# Patient Record
Sex: MALE | Race: WHITE | NOT HISPANIC OR LATINO
[De-identification: names, ages, dates, MRNs, and addresses within clinical notes are randomized per-mention and may not be internally consistent; named-entity substitution may affect disease eponyms.]

---
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
## 2022-08-12 ENCOUNTER — EMERGENCY (EMERGENCY)
Facility: HOSPITAL | Age: 22
LOS: 1 days | Discharge: HOME | End: 2022-08-12
Attending: EMERGENCY MEDICINE | Admitting: EMERGENCY MEDICINE
Payer: COMMERCIAL

## 2022-08-12 VITALS
TEMPERATURE: 99 F | OXYGEN SATURATION: 97 % | RESPIRATION RATE: 16 BRPM | DIASTOLIC BLOOD PRESSURE: 71 MMHG | SYSTOLIC BLOOD PRESSURE: 112 MMHG | HEART RATE: 90 BPM

## 2022-08-12 VITALS
OXYGEN SATURATION: 98 % | TEMPERATURE: 101 F | HEIGHT: 67.5 IN | WEIGHT: 190.04 LBS | DIASTOLIC BLOOD PRESSURE: 59 MMHG | HEART RATE: 128 BPM | RESPIRATION RATE: 20 BRPM | SYSTOLIC BLOOD PRESSURE: 107 MMHG

## 2022-08-12 LAB
FLUAV AG NPH QL: SIGNIFICANT CHANGE UP
FLUBV AG NPH QL: SIGNIFICANT CHANGE UP
RSV RNA NPH QL NAA+NON-PROBE: SIGNIFICANT CHANGE UP
SARS-COV-2 RNA SPEC QL NAA+PROBE: DETECTED

## 2022-08-12 PROCEDURE — 99285 EMERGENCY DEPT VISIT HI MDM: CPT | Mod: 25

## 2022-08-12 PROCEDURE — 71045 X-RAY EXAM CHEST 1 VIEW: CPT

## 2022-08-12 PROCEDURE — 93010 ELECTROCARDIOGRAM REPORT: CPT

## 2022-08-12 PROCEDURE — 99284 EMERGENCY DEPT VISIT MOD MDM: CPT

## 2022-08-12 PROCEDURE — 71045 X-RAY EXAM CHEST 1 VIEW: CPT | Mod: 26

## 2022-08-12 PROCEDURE — 93005 ELECTROCARDIOGRAM TRACING: CPT

## 2022-08-12 PROCEDURE — 87637 SARSCOV2&INF A&B&RSV AMP PRB: CPT

## 2022-08-12 RX ORDER — ONDANSETRON 8 MG/1
4 TABLET, FILM COATED ORAL ONCE
Refills: 0 | Status: COMPLETED | OUTPATIENT
Start: 2022-08-12 | End: 2022-08-12

## 2022-08-12 RX ORDER — IBUPROFEN 200 MG
800 TABLET ORAL ONCE
Refills: 0 | Status: COMPLETED | OUTPATIENT
Start: 2022-08-12 | End: 2022-08-12

## 2022-08-12 RX ADMIN — ONDANSETRON 4 MILLIGRAM(S): 8 TABLET, FILM COATED ORAL at 16:49

## 2022-08-12 RX ADMIN — Medication 800 MILLIGRAM(S): at 16:49

## 2022-08-12 NOTE — ED ADULT TRIAGE NOTE - CHIEF COMPLAINT QUOTE
Patient is a 21yo male complaining of fever and cough and flu like symptoms . Patient tested covid positive yesterday Patient states the symptoms are getting worse today. Patient took Tylenol yesterday

## 2022-08-12 NOTE — ED PROVIDER NOTE - OBJECTIVE STATEMENT
23 yo male with no significant pmh presents to the ED c/o fever, chills, body aches, mild cough since yesterday.  Associated with nausea. Patient had rapid covid swab at [facility] office which was positive. Patient did not take any medication for fever today. Denies chest pain, vomiting, diarrhea, urinary sxs, flank pain.

## 2022-08-12 NOTE — ED PROVIDER NOTE - PROGRESS NOTE DETAILS
Reevaluated patient at bedside.  Patient feeling much improved.  Discussed the results of all diagnostic testing in ED and copies of all available reports given.   An opportunity to ask questions was provided.  Discussed the importance of prompt, close medical follow-up.  Patient will return with any changes, concerns or persistent/worsening symptoms.  Understanding of all instructions verbalized.

## 2022-08-12 NOTE — ED PROVIDER NOTE - PATIENT PORTAL LINK FT
You can access the FollowMyHealth Patient Portal offered by [facility] by registering at the following website: [email or web address]. By joining [facility]’s FollowMyHealth portal, you will also be able to view your health information using other applications (apps) compatible with our system.

## 2022-08-12 NOTE — ED PROVIDER NOTE - CLINICAL SUMMARY MEDICAL DECISION MAKING FREE TEXT BOX
21 yo male with no significant pmh presents to the ED c/o fever, chills, body aches, mild cough since yesterday.  Associated with nausea. Patient had rapid covid swab at [facility] office which was positive. Patient did not take any medication for fever today. Denies chest pain, vomiting, diarrhea, urinary sxs, flank pain.  pt on exam febrile, tachy, antipyretic, zofran,  drink fluids.  lungs cta, cxt neg, pt vs normal after with antipyretic and oral fluids, covid +, fu pmd, antipyretics, fluids, cxr neg, fu pmd, return for sob, uncontrolled vomiting

## 2022-08-13 ENCOUNTER — TRANSCRIPTION ENCOUNTER (OUTPATIENT)
Age: 22
End: 2022-08-13

## 2022-08-15 NOTE — ED POST DISCHARGE NOTE - REASON FOR FOLLOW-UP
Informed pt that he tested positive for Covid and continued to fu c his pediatrician to further evalution.  If condtions worsen, to return to ED. Other

## 2022-12-08 NOTE — ED ADULT TRIAGE NOTE - ESI TRIAGE ACUITY LEVEL, MLM
Received forms from [employer] for patients recent procedure, needing to be completed and faxed back for patients disability claim. Placed in [facility] mailbox.   
2

## 2023-09-12 NOTE — ED PEDIATRIC NURSE NOTE - NSIMPLEMENTINTERV_GEN_ALL_ED
How Severe Are Your Spot(S)?: moderate What Is The Reason For Today's Visit?: Full Body Skin Examination What Is The Reason For Today's Visit? (Being Monitored For X): concerning skin lesions on an annual basis Implemented All Fall Risk Interventions:  [place] to call system. Call bell, personal items and telephone within reach. Instruct patient to call for assistance. Room bathroom lighting operational. Non-slip footwear when patient is off stretcher. Physically safe environment: no spills, clutter or unnecessary equipment. Stretcher in lowest position, wheels locked, appropriate side rails in place. Provide visual cue, wrist band, yellow gown, etc. Monitor gait and stability. Monitor for mental status changes and reorient to person, place, and time. Review medications for side effects contributing to fall risk. Reinforce activity limits and safety measures with patient and family.

## 2023-11-07 NOTE — ED PROVIDER NOTE - PSH
Radiation Oncology Consultation    Shaneka Givens  : 1940  MRN: [id number]    Date of Consult: 2017    Primary Physician: Johanny Taveras MD  Referring Physician: Matthew Hightower MD    Cancer Diagnosis: Esophageal cancer     Cancer Stage: cT3 N1 MX, IIIA    Chief Complaint: 78 y/o female with history of anemia, workup revealed an esophageal mass biopsy confirmed squamous cell carcinoma, EUS staged as T3, w/ biopsy of gastric hepatic LN positive for SqCC, N1. Seen today in [facility] to discuss treatment recommendations.     Radiation History:   None    History of Present Illness:  The patient is a 77-year-old female with a medical history significant for hyperlipidemia, diabetes mellitus type 2, hypertension, peripheral vascular disease, and coronary artery disease. She has a history of anemia dating to at least . She underwent an EGD on 13 by Dr. Boyle with finding of bleeding secondary to AVM, Hemoclip placed. On recent routine follow-up with her primary care physician she was noted to have anemia with a hemoglobin of 9.2016 and 8.2017. She also reported a several month history of melana and was referred to Dr. Boyle for evaluation to ruleout GI bleed. EGD 17 showed questionable Skaggs's in the distal esophagus and a mid esophageal lesion at approximately 30 cm, measuring 2 cm in length, gastritis and gastric AVM without evidence of bleeding. Biopsy from the mid esophageal lesion returned as invasive squamous cell carcinoma, moderately differentiated. A CT of the C/A/P from 17 shows no evidence of metastatic disease within the chest, CT of the abdomen is suggestive of gastric cardia thickening and adjacent gastrohepatic adenopathy. Follow-up CT of the chest from 6/15/16 redemonstrates multiple stable pulmonary nodules dating back to at least 14 which are considered benign. 2 new small parenchymal opacities at the posterior aspect of the left midlung with differential 
including atelectasis versus infiltrate. An EUS from 5/31/17 demonstrates a 3 cm raised lesion in the midesophagus extending from 29-32 cm occupying approximately 30% of the circumference of the esophageal lumen. Hypoechoic lesion measuring approximately 12.5 mm x 7.4 mm located about 29-30 cm extending from the superficial mucosa through the muscular propria and into the adventitia. There are 2 malignant-appearing lymph nodes in the gastrohepatic ligament area measuring approximately 11.7 x 8.5 and 7.4 x 6.1 mm. Biopsies returned as moderately differentiated keratinizing squamous cell carcinoma. This stages her clinically as a T3 N1 MX. Remainder of the US examination was normal. PET/CT from 5/31/17 demonstrates a focal area of increased FDG uptake in the midesophagus compatible with history of esophageal carcinoma. Additional area of mild FDG uptake is noted at the distal esophagus and may reflect inflammation or Skaggs's esophagus as identified on the EGD. FDG avid gastrohepatic ligament lymph node is suspicious for metastasis. Mild FDG avidity is noted within a few left upper lobe semisolid and groundglass pulmonary nodules, one of which has slightly increased in size and appears more solid from the comparison exam going back to 8/20/14. Low-grade malignancy cannot be excluded. No FDG avid thoracic lymphadenopathy is noted.    The patient presents today to the multidisciplinary clinic accompanied by several family members. She reports an unintentional weight loss of approximately 17 pounds over the past year and a half, however denies dysphagia, she states she is able to eat without restriction. She does tend to eat only 2 meals a day and states that she does not snack in between meals. She denies odynophagia or headaches. She was having melana and started iron supplements for anemia, her stools remain dark in color however her hemoglobin improved to 11.2 on 5/31/17. She has a long-standing history of nicotine 
dependence, per record review she has smoked up to 3 packs of cigarettes a day, decreasing to 1/4 ppd within the past few years. She underwent repair of transperineal rectocele and enterocele 2016 and states that her bowel movements have not been normal since then. She reports a several year history of numbness and tingling involving her fingers and toes and states that she tends to be children most of the time. She is very independent and able to perform all of her ADLs. She states that her   from rectal cancer in . She currently lives with her son.          Past Medical History:  Past Medical History:   Diagnosis Date   • Anemia 2017   • Benign hypertension 2012   • Complete rupture of rotator cuff     surg in    • Endometriosis of other specified sites    • Esophageal mass 2017   • Gastric AVM 2017   • Gastritis 2017   • HYPERLIPIDEMIA NEC/NOS, low hdl 2001   • Intramural leiomyoma of uterus    • Malignant neoplasm (CMS/[facility]) 2017    Invasive squamous cell carcinoma, moderately differentiated.    • PVD (peripheral vascular disease) (CMS/[facility]) 10/8/2012   • Tobacco use disorder    • Type II or unspecified type diabetes mellitus without mention of complication, not stated as uncontrolled     Diabetes Mellitus   • Unspecified hypothyroidism     + antibodies   • Urinary tract infection        Past Surgical History:  Past Surgical History:   Procedure Laterality Date   • APPENDECTOMY  high school   • BACK SURGERY  2005    cervical fusion and disectomy   • BIOPSY OF BREAST, NEEDLE CORE  10/6/03    calcifications in site 1 and 2   • COLONOSCOPY W BIOPSY  3/17/04    Dr. Rodriguez w/bx   • DEXA BONE DENSITY AXIAL SKELETON  03   • DEXA BONE DENSITY AXIAL SKELETON  03    T scor 0.1 to -1.o   • DEXA BONE DENSITY AXIAL SKELETON  06    Tscore: left hip: -1.2, osteopenia. Lumbar spine tscore: -0.7. Forearm tscore 0.9 Normal   • ESOPHAGOGASTRODUODENOSCOPY 
TRANSORAL FLEX W/BX SINGLE OR MULT  05/18/2017    Esophaeal mass   • FOOT SURGERY     • HYSTERECTOMY  1971   • LOWER EXTREMITY ANGIOGRAMS/POSSIBLE PTA/POSSIBLE STENT  11/20/12    Dr. Moya at [facility]. Severe PVD. Total occlusion of R Superficial femoral artery. 90 % stenosis of distal left superficial femoral artery with stenting. 60 % lesion in mid SFA. Pt needs peripheral bypass surgery on the right. Both posterior tibial and both peroneal arteries are totally occluded below the knee. Left anterior tibial artery is totally occluded.    • MAMMO DIAGNOSTIC BILATERAL  12-6-2013   • MRI SHOULDER ARTHRO LFT  12/3/04    Very mild tendinopathy. Minimal degenerative subcortical cysts. No full thickness tear.    • MRI SHOULDER ARTHRO RT  1/6/03    small collection of fluid just caudal to the tip of coracoid process of scapula, mild DJD of acromioclavicular joint, smal subchondral cyst of postlateral humeral head   • REPAIR ROTATOR CUFF,ACUTE  2/10/03    right shoulder   • REPAIR ROTATOR CUFF,CHRONIC  2/05    left shoulder   • REPAIR ROTATOR CUFF,CHRONIC  2003    right shoulder   • SHLDR ARTHROSCOP,DIAGNOSTIC  2/10/03   • TONSILLECTOMY AND ADENOIDECTOMY     • XRAY MAMMOGRAM SCREENING BILAT  9/5/02    bilta benign calcifications   • XRAY MAMMOGRAM SCREENING BILAT  9/26/03    [facility] 2 groups of indeterminate calcifications in the retoareolar region of R breast, superiorly, raad pt return for spot magnification views   • XRAY MAMMOGRAM SCREENING BILAT  10/13/04    postbiopsy changes in R breast, benign-appearing calcifications are stable       Medications:  Current Outpatient Prescriptions   Medication Sig Dispense Refill   • pioglitazone-metformin (ACTOPLUS MET)  MG per tablet Take 1 tablet by mouth  daily 90 tablet 2   • simvastatin (ZOCOR) 20 MG tablet Take 1 tablet by mouth  daily 90 tablet 2   • losartan (COZAAR) 50 MG tablet Take 1 tablet by mouth  daily 90 tablet 2   • clopidogrel (PLAVIX) 75 MG 
tablet Take 1 tablet by mouth  daily 90 tablet 2   • oxybutynin (DITROPAN) 5 MG tablet Take 1 tablet by mouth  twice a day 180 tablet 2   • levothyroxine (SYNTHROID, LEVOTHROID) 75 MCG tablet Take 1 tablet by mouth daily. 90 tablet 2   • cilostazol (PLETAL) 100 MG tablet Take 1 tablet by mouth 2 times daily. 180 tablet 3   • aspirin 81 MG EC tablet Take 1 tablet by mouth daily. 0 30 tablet 0   • cholecalciferol (VITAMIN D3) 1000 UNITS tablet Take 1 tablet by mouth daily. 1 tablet 0   • Lancets (ACCU-CHEK MULTICLIX) Misc TO TAKE FINGERSTICKS ONCE DAILY. 102 each 2   • ACCU-CHEK HONG PLUS TO CHECK FINGERSTICKS ONCE DAILY. DX: TYPE 2 DIABETES. ICD-10: E11.9 ICD-9 250.00 100 strip 2   • furosemide (LASIX) 20 MG tablet Take 1 tablet by mouth daily. 5 tablet 0   • Calcium Carb-Cholecalciferol 500-200 MG-UNIT Tab Take 1 tablet by mouth daily.     • Multiple Vitamins-Minerals (CENTRUM SILVER) tablet Take 1 tablet by mouth daily.     • [DISCONTINUED] oxybutynin (DITROPAN) 5 MG tablet Take 1 tablet by mouth 2 times daily. 180 tablet 3     No current facility-administered medications for this visit.        Allergies:  ALLERGIES:   Allergen Reactions   • Cephalosporins HIVES and RASH     hives on abdomen       Family History:  family history includes Cancer in her father; Diabetes in her brother, maternal grandmother, and sister; GI in her sister; OTHER in her mother.    Social History:  Social History     Social History   • Marital status:      Spouse name: N/A   • Number of children: 2   • Years of education: N/A     Occupational History   • retired [employer]     Social History Main Topics   • Smoking status: Current Every Day Smoker     Packs/day: 3.00     Years: 55.00     Types: Cigarettes   • Smokeless tobacco: Never Used      Comment: 2/2/14 pt cut back to 5 cigarettes a day, previously smoked 3 PPD.    • Alcohol use 0.0 - 0.6 oz/week     0 - 1 Standard drinks or equivalent per week      Comment: couple drinks a month 
  • Drug use: No   • Sexual activity: Not on file     Other Topics Concern   • Exercise Yes   • Seat Belt Yes     Social History Narrative       Review of Systems:  As noted in the History of Present Illness. A 10 point review of systems was completed and all were  negative, except as noted in the HPI.    Pain: no  Level of pain: 0 /10    Physical Exam:  General: Appears stated age, well-developed, well-nourished, no acute distress  Visit Vitals   • /63   • Pulse 78   • Temp 98.2 °F (36.8 °C) (Oral)   • Ht 5' 4\" (1.626 m)   • Wt 46 kg   • SpO2 96%   • BMI 17.41 kg/m2   Neurologic:  Alert and oriented x 3. Cranial nerves 2-12 normal. Normal motor function. Normal sensory function. No focal deficits noted.  Lungs: Equal, symmetric expansion, auditory distress  Integument:  Well hydrated, no rash.  Extremity: Ambulatory,. No cyanosis. No clubbing.  ECOG Performance Status: 0: Fully active, able to carry on all pre-disease performance without restriction    Laboratory Data:  Sodium (mmol/L)   Date Value   04/21/2017 142     Potassium (mmol/L)   Date Value   05/31/2017 4.2     Chloride (mmol/L)   Date Value   04/21/2017 109 (H)     CO2 (mmol/L)   Date Value   09/12/2012 29     Carbon Dioxide (mmol/L)   Date Value   04/21/2017 26     BUN (mg/dL)   Date Value   04/21/2017 15     Creatinine (mg/dL)   Date Value   05/31/2017 0.59     Glucose (mg/dL)   Date Value   04/21/2017 143 (H)     CALCIUM (mg/dL)   Date Value   04/21/2017 9.2   09/12/2012 9.9     No components found for: MAGNESIUM  No components found for: PHOSPHORUS  TOTAL BILIRUBIN (mg/dL)   Date Value   04/21/2017 0.2     AST/SGOT (Units/L)   Date Value   04/21/2017 14     ALT/SGPT (Units/L)   Date Value   04/21/2017 14     No components found for: TOTAL PROTEIN  Albumin (g/dL)   Date Value   04/21/2017 3.8     ALK PHOSPHATASE (Units/L)   Date Value   04/21/2017 76     WBC (K/mcL)   Date Value   05/31/2017 5.5     HGB (g/dL)   Date Value   05/31/2017 11.2 (L) 
    HCT (%)   Date Value   05/31/2017 35.8 (L)     PLT   Date Value   05/31/2017 251 K/mcL   10/14/2013 420 K/mcL   08/11/2005 316 K/uL     Absolute Neutrophil (K/mcL)   Date Value   04/21/2017 4.3       Review of Imaging:  As noted in the History of Present Illness.  The recent imaging was personally reviewed.    Impression:  The patient is a 78 y/o female with a prior history of anemia and AVM in 2013. More recently experiencing anemia with melena, GI workup revealed a mid esophageal mass,  biopsy confirmed squamous cell carcinoma. Staged as T3 on EUS with biopsy of gastric hepatic LN positive for squamous cell carcinoma, N1. PET/CT demonstrates focal increased uptake in the midesophagus and gastrohepatic ligament lymph node. There is mild FDG avidity noted within a few left upper lobe semisolid and groundglass pulmonary nodules. Seen today in [facility] to discuss treatment recommendations.       Recommendations:  Obtain thoracic surgery opinion regarding surgical option. In the interim would recommend initiating concurrent chemorads in a neoadjuvant fashion. The radiation dose will depend on final treatment plan. Will plan simulation this week with treatment to begin 6/19/17, will coordinate with Dr. Quinones.     Thank you Dr. Hightower for including us in the care of your patient. We do hope that she does well.    At least *** minutes were spent face-to-face with the patient with greater than 50% of the time dedicated to education, counseling, reviewing the patient’s medical record and/or coordination of care.    
No significant past surgical history
No - the patient is unable to be screened due to medical condition

## 2024-02-18 ENCOUNTER — NON-APPOINTMENT (OUTPATIENT)
Age: 24
End: 2024-02-18